# Patient Record
Sex: MALE
[De-identification: names, ages, dates, MRNs, and addresses within clinical notes are randomized per-mention and may not be internally consistent; named-entity substitution may affect disease eponyms.]

---

## 2019-06-25 PROBLEM — Z00.00 ENCOUNTER FOR PREVENTIVE HEALTH EXAMINATION: Status: ACTIVE | Noted: 2019-06-25

## 2019-07-09 ENCOUNTER — APPOINTMENT (OUTPATIENT)
Dept: ORTHOPEDIC SURGERY | Facility: CLINIC | Age: 64
End: 2019-07-09
Payer: OTHER MISCELLANEOUS

## 2019-07-09 ENCOUNTER — OUTPATIENT (OUTPATIENT)
Dept: OUTPATIENT SERVICES | Facility: HOSPITAL | Age: 64
LOS: 1 days | End: 2019-07-09
Payer: OTHER MISCELLANEOUS

## 2019-07-09 PROCEDURE — 20610 DRAIN/INJ JOINT/BURSA W/O US: CPT | Mod: LT

## 2019-07-09 PROCEDURE — 73562 X-RAY EXAM OF KNEE 3: CPT | Mod: 26,LT

## 2019-07-09 PROCEDURE — 99203 OFFICE O/P NEW LOW 30 MIN: CPT | Mod: 25

## 2019-07-09 PROCEDURE — 73562 X-RAY EXAM OF KNEE 3: CPT

## 2019-07-09 NOTE — PROCEDURE
[Injection] : Injection [Left] : of the left [Knee Joint] : knee joint [Joint Pain] : Joint pain [Patient] : patient [Benefits] : benefits [Alternatives] : alternatives [Risk] : risk [Bleeding] : bleeding [Allergic Reaction] : allergic reaction [Infection] : infection [Verbal Consent Obtained] : verbal consent was obtained prior to the procedure [Alcohol] : Alcohol [Betadine] : Betadine [20] : a 20-gauge [1% Lidocaine___(mL)] : [unfilled] mL of 1% Lidocaine [Methylpred. 40mg/mL___(mL)] : [unfilled] mL of 40mg/mL methylprednisolone [Bandage Applied] : a bandage [Tolerated Well] : The patient tolerated the procedure well [None] : none
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
## 2019-07-12 NOTE — REVIEW OF SYSTEMS
[Joint Swelling] : joint swelling [Negative] : Heme/Lymph [Joint Pain] : no joint pain [FreeTextEntry9] : left knee

## 2019-07-12 NOTE — PHYSICAL EXAM
[de-identified] : left knee:\par medial joint line tenderness\par mildly positive mcmurrays\par ROM 0-130\par stable v/v\par stable lachman\par patella tracks midline [de-identified] : left knee:\par grade 2 medial compartment OA\par patella midline

## 2019-07-17 ENCOUNTER — APPOINTMENT (OUTPATIENT)
Dept: PHYSICAL MEDICINE AND REHAB | Facility: CLINIC | Age: 64
End: 2019-07-17

## 2019-08-07 ENCOUNTER — APPOINTMENT (OUTPATIENT)
Dept: ORTHOPEDIC SURGERY | Facility: CLINIC | Age: 64
End: 2019-08-07
Payer: OTHER MISCELLANEOUS

## 2019-08-07 PROCEDURE — 99213 OFFICE O/P EST LOW 20 MIN: CPT

## 2019-08-08 NOTE — PHYSICAL EXAM
[de-identified] : left knee:\par medial joint line tenderness\par mildly positive mcmurrays\par ROM 0-130\par stable v/v\par stable lachman\par patella tracks midline

## 2019-08-08 NOTE — HISTORY OF PRESENT ILLNESS
[de-identified] : Following up after a left knee injection, he has had a massive improvement and is very happy with the results. Has not returned to his normal activities yet.

## 2019-08-08 NOTE — ASSESSMENT
[FreeTextEntry1] : Assessment/plan\par Left knee arthritis, he will continue ADLs and leisure activities as tolerated, reviewed the at-home exercises in detail, he'll follow up as needed.\par \par All medical record entries made by the PA/Scribe/Fellow are at my, Dr. Rogelio Chappell's direction and personally dictated by me on 08/07/2019]. I have reviewed the chart and agree that the record accurately reflects my personal performance of the history, physical exam, assessment, and plan. I have also personally directed reviewed, and agreed with the chart.\par

## 2019-10-16 ENCOUNTER — APPOINTMENT (OUTPATIENT)
Dept: ORTHOPEDIC SURGERY | Facility: CLINIC | Age: 64
End: 2019-10-16
Payer: OTHER MISCELLANEOUS

## 2019-10-16 PROCEDURE — 99212 OFFICE O/P EST SF 10 MIN: CPT | Mod: 25

## 2019-10-16 PROCEDURE — 20610 DRAIN/INJ JOINT/BURSA W/O US: CPT | Mod: LT

## 2019-10-18 NOTE — HISTORY OF PRESENT ILLNESS
[de-identified] : Patient is a 63 year old male who is here for left knee pain. Pain is worse when sitting for longer than 20 minutes. He had a knee injection with complete resolution of pain in the past and is requesting another one today.

## 2019-10-18 NOTE — PROCEDURE
[de-identified] : After obtaining verbal consent and under normal sterile conditions the left knee joint was injected with 4 cc of lidocaine and 1 cc of 40 mg per mL of Kenalog

## 2019-10-18 NOTE — PHYSICAL EXAM
[de-identified] : General: Not in acute distress, dressed appropriately, sitting on examination table\par Skin: Warm and dry, normal turgor, no rashes\par Neurological: AOx3, Cranial nerves grossly in tact\par Psych: Mood and affect appropriate\par Left Knee: No swelling edema erythema redness or drainage. tender anteriorly. Alignment: Neutral ROM: 0-130. Stable. 5/5 Strength. DNVI. Ambulates without devices.

## 2019-10-18 NOTE — ASSESSMENT
[FreeTextEntry1] : Assessment/Plan\par \par Left knee pain with OA\par \par The left knee was injected as described above, today they will rest, ice and use Tylenol PRN for pain. F/U 3 months or PRN.\par \par \par All medical record entries made by the PA/Scribe/Fellow are at my, Dr. Rogelio Chappell's direction and personally dictated by me on 10/16/2019]. I have reviewed the chart and agree that the record accurately reflects my personal performance of the history, physical exam, assessment, and plan. I have also personally directed reviewed, and agreed with the chart.\par

## 2019-12-10 ENCOUNTER — APPOINTMENT (OUTPATIENT)
Dept: ORTHOPEDIC SURGERY | Facility: CLINIC | Age: 64
End: 2019-12-10
Payer: OTHER MISCELLANEOUS

## 2019-12-10 PROCEDURE — 99211 OFF/OP EST MAY X REQ PHY/QHP: CPT

## 2019-12-12 NOTE — HISTORY OF PRESENT ILLNESS
[de-identified] : Lam is a 64 year old male here for follow up of left knee pain. Patient is here 5 weeks early for his next injection. \par \par

## 2019-12-12 NOTE — ASSESSMENT
[FreeTextEntry1] : Assessment/Plan\par \par Left knee OA. \par \par Patient was instructed to return to the office in 2 weeks to get his injection done at a more acceptable time frame. \par \par All medical record entries made by the PA/eNto/Fellow are at my, Dr. Rogelio Chappell's direction and personally dictated by me on 12/10/2019]. I have reviewed the chart and agree that the record accurately reflects my personal performance of the history, physical exam, assessment, and plan. I have also personally directed reviewed, and agreed with the chart.\par

## 2019-12-12 NOTE — PHYSICAL EXAM
[de-identified] : General: Not in acute distress, dressed appropriately, sitting on examination table\par Skin: Warm and dry, normal turgor, no rashes\par Neurological: AOx3, Cranial nerves grossly in tact\par Psych: Mood and affect appropriate\par \par Left Knee: No swelling edema erythema redness or drainage. tender anteriorly. Alignment: Neutral ROM: 0-130. Stable. 5/5 Strength. DNVI. Ambulates without devices.\par \par  [de-identified] : No imaging today\par

## 2019-12-27 ENCOUNTER — APPOINTMENT (OUTPATIENT)
Dept: ORTHOPEDIC SURGERY | Facility: CLINIC | Age: 64
End: 2019-12-27

## 2020-01-22 ENCOUNTER — APPOINTMENT (OUTPATIENT)
Dept: ORTHOPEDIC SURGERY | Facility: CLINIC | Age: 65
End: 2020-01-22
Payer: OTHER MISCELLANEOUS

## 2020-01-22 PROCEDURE — 99213 OFFICE O/P EST LOW 20 MIN: CPT

## 2020-01-28 NOTE — ASSESSMENT
[FreeTextEntry1] : Assessment/Plan\par \par Left knee OA\par \par The left knee was injected as described above, today they will rest, ice and use Tylenol PRN for pain. \par \par F/U 3 months or PRN.\par \par \par All medical record entries made by the PA/Quentinibkali/Fellow are at my, Dr. Rogelio Chappell's direction and personally dictated by me on 01/22/2020]. I have reviewed the chart and agree that the record accurately reflects my personal performance of the history, physical exam, assessment, and plan. I have also personally directed reviewed, and agreed with the chart.\par

## 2020-01-28 NOTE — PHYSICAL EXAM
[de-identified] : General: Not in acute distress, dressed appropriately, sitting on examination table\par Skin: Warm and dry, normal turgor, no rashes\par Neurological: AOx3, Cranial nerves grossly in tact\par Psych: Mood and affect appropriate\par \par Left Knee: No swelling edema erythema redness or drainage. tender anteriorly. Alignment: Neutral ROM: 0-130. Stable. 5/5 Strength. DNVI. Ambulates without devices

## 2020-01-28 NOTE — END OF VISIT
[FreeTextEntry3] : By signing my name below, I, Kay Arreaga, attest that this documentation has been prepared under the direction and in the presence of Edward Rivas PA-C.

## 2020-01-28 NOTE — PROCEDURE
[de-identified] : After obtaining verbal consent and under normal sterile conditions the left knee joint was injected with 4 cc of lidocaine and 1 cc of 40 mg per mL of Kenalog.\par \par

## 2020-06-29 ENCOUNTER — APPOINTMENT (OUTPATIENT)
Dept: ORTHOPEDIC SURGERY | Facility: CLINIC | Age: 65
End: 2020-06-29
Payer: OTHER MISCELLANEOUS

## 2020-06-29 DIAGNOSIS — M17.12 UNILATERAL PRIMARY OSTEOARTHRITIS, LEFT KNEE: ICD-10-CM

## 2020-06-29 PROCEDURE — 99213 OFFICE O/P EST LOW 20 MIN: CPT | Mod: 25

## 2020-06-29 PROCEDURE — 20610 DRAIN/INJ JOINT/BURSA W/O US: CPT | Mod: LT

## 2020-07-01 RX ORDER — BLOOD-GLUCOSE METER
W/DEVICE EACH MISCELLANEOUS
Qty: 1 | Refills: 0 | Status: ACTIVE | COMMUNITY
Start: 2020-05-27

## 2020-07-01 RX ORDER — OMEPRAZOLE 20 MG/1
20 CAPSULE, DELAYED RELEASE ORAL
Qty: 30 | Refills: 0 | Status: ACTIVE | COMMUNITY
Start: 2020-05-27

## 2020-07-01 RX ORDER — AZITHROMYCIN 250 MG/1
250 TABLET, FILM COATED ORAL
Qty: 6 | Refills: 0 | Status: ACTIVE | COMMUNITY
Start: 2020-04-20

## 2020-07-01 RX ORDER — BLOOD SUGAR DIAGNOSTIC
STRIP MISCELLANEOUS
Qty: 100 | Refills: 0 | Status: ACTIVE | COMMUNITY
Start: 2020-05-27

## 2020-07-01 RX ORDER — DICYCLOMINE HYDROCHLORIDE 10 MG/1
10 CAPSULE ORAL
Qty: 30 | Refills: 0 | Status: ACTIVE | COMMUNITY
Start: 2020-05-27

## 2020-07-01 RX ORDER — HYDROCORTISONE 2.5% 25 MG/G
2.5 CREAM TOPICAL
Qty: 60 | Refills: 0 | Status: ACTIVE | COMMUNITY
Start: 2020-04-29

## 2020-07-01 RX ORDER — LANCETS 30 GAUGE
EACH MISCELLANEOUS
Qty: 100 | Refills: 0 | Status: ACTIVE | COMMUNITY
Start: 2020-05-27

## 2020-07-01 RX ORDER — ISOPROPYL ALCOHOL 70 ML/100ML
70 SWAB TOPICAL
Qty: 100 | Refills: 0 | Status: ACTIVE | COMMUNITY
Start: 2020-05-27

## 2020-07-02 PROBLEM — M17.12 ARTHRITIS OF KNEE, LEFT: Status: ACTIVE | Noted: 2019-07-09

## 2020-07-02 NOTE — DISCUSSION/SUMMARY
[de-identified] : A\par Left knee OA\par \par P\par CSI provided to L knee\par COntinue OTC analgisia as needed\par Rest and ice and needed\par RTO in 3 months\par \par All medical record entries made by the PA/Scribe/Fellow are at my, Dr. Rogelio Chappell's direction and personally dictated by me on 06/29/2020]. I have reviewed the chart and agree that the record accurately reflects my personal performance of the history, physical exam, assessment, and plan. I have also personally directed reviewed, and agreed with the chart.\par

## 2020-07-02 NOTE — HISTORY OF PRESENT ILLNESS
[de-identified] : L knee OA f/u. CSI in January worked well however the pain has slowly returned and would like a further injection prior to traveling to [place] for several months requirein significant amounts of walking.

## 2020-07-02 NOTE — PROCEDURE
[de-identified] : Under sterile conditions after verbal consent the L knee was injection with 40mg Kenalog and 4cc 1% Lidocaine plain which he tolerated well with interval improvement in symptoms.

## 2020-07-02 NOTE — PHYSICAL EXAM
[de-identified] : LLE: Pain and crepitus with ROM. TTP medial joint space. Skin intact without effusion.